# Patient Record
Sex: FEMALE | Race: WHITE | NOT HISPANIC OR LATINO | Employment: FULL TIME | URBAN - METROPOLITAN AREA
[De-identification: names, ages, dates, MRNs, and addresses within clinical notes are randomized per-mention and may not be internally consistent; named-entity substitution may affect disease eponyms.]

---

## 2017-07-24 ENCOUNTER — GENERIC CONVERSION - ENCOUNTER (OUTPATIENT)
Dept: OTHER | Facility: OTHER | Age: 47
End: 2017-07-24

## 2017-07-24 ENCOUNTER — ALLSCRIPTS OFFICE VISIT (OUTPATIENT)
Dept: OTHER | Facility: OTHER | Age: 47
End: 2017-07-24

## 2017-07-24 ENCOUNTER — APPOINTMENT (OUTPATIENT)
Dept: LAB | Facility: MEDICAL CENTER | Age: 47
End: 2017-07-24
Payer: COMMERCIAL

## 2017-07-24 DIAGNOSIS — R63.5 ABNORMAL WEIGHT GAIN: ICD-10-CM

## 2017-07-24 DIAGNOSIS — Z12.31 ENCOUNTER FOR SCREENING MAMMOGRAM FOR MALIGNANT NEOPLASM OF BREAST: ICD-10-CM

## 2017-07-24 DIAGNOSIS — E78.5 HYPERLIPIDEMIA: ICD-10-CM

## 2017-07-24 DIAGNOSIS — Z87.19 PERSONAL HISTORY OF OTHER DISEASES OF THE DIGESTIVE SYSTEM: ICD-10-CM

## 2017-07-24 DIAGNOSIS — E55.9 VITAMIN D DEFICIENCY: ICD-10-CM

## 2017-07-24 DIAGNOSIS — R73.9 HYPERGLYCEMIA: ICD-10-CM

## 2017-07-24 DIAGNOSIS — K21.9 GASTRO-ESOPHAGEAL REFLUX DISEASE WITHOUT ESOPHAGITIS: ICD-10-CM

## 2017-07-24 LAB
25(OH)D3 SERPL-MCNC: 24.9 NG/ML (ref 30–100)
ALBUMIN SERPL BCP-MCNC: 3.9 G/DL (ref 3.5–5)
ALP SERPL-CCNC: 75 U/L (ref 46–116)
ALT SERPL W P-5'-P-CCNC: 52 U/L (ref 12–78)
ANION GAP SERPL CALCULATED.3IONS-SCNC: 4 MMOL/L (ref 4–13)
AST SERPL W P-5'-P-CCNC: 27 U/L (ref 5–45)
BASOPHILS # BLD AUTO: 0.02 THOUSANDS/ΜL (ref 0–0.1)
BASOPHILS NFR BLD AUTO: 0 % (ref 0–1)
BILIRUB SERPL-MCNC: 0.36 MG/DL (ref 0.2–1)
BILIRUB UR QL STRIP: NORMAL
BUN SERPL-MCNC: 10 MG/DL (ref 5–25)
CALCIUM SERPL-MCNC: 8.9 MG/DL (ref 8.3–10.1)
CHLORIDE SERPL-SCNC: 108 MMOL/L (ref 100–108)
CO2 SERPL-SCNC: 28 MMOL/L (ref 21–32)
COLOR UR: YELLOW
CREAT SERPL-MCNC: 0.72 MG/DL (ref 0.6–1.3)
EOSINOPHIL # BLD AUTO: 0 THOUSAND/ΜL (ref 0–0.61)
EOSINOPHIL NFR BLD AUTO: 0 % (ref 0–6)
ERYTHROCYTE [DISTWIDTH] IN BLOOD BY AUTOMATED COUNT: 13 % (ref 11.6–15.1)
EST. AVERAGE GLUCOSE BLD GHB EST-MCNC: 114 MG/DL
GFR SERPL CREATININE-BSD FRML MDRD: 100 ML/MIN/1.73SQ M
GLUCOSE (HISTORICAL): NORMAL
GLUCOSE SERPL-MCNC: 85 MG/DL (ref 65–140)
HBA1C MFR BLD: 5.6 % (ref 4.2–6.3)
HCT VFR BLD AUTO: 42.2 % (ref 34.8–46.1)
HGB BLD-MCNC: 14.2 G/DL (ref 11.5–15.4)
HGB UR QL STRIP.AUTO: NORMAL
KETONES UR STRIP-MCNC: NORMAL MG/DL
LEUKOCYTE ESTERASE UR QL STRIP: NORMAL
LYMPHOCYTES # BLD AUTO: 1.49 THOUSANDS/ΜL (ref 0.6–4.47)
LYMPHOCYTES NFR BLD AUTO: 33 % (ref 14–44)
MCH RBC QN AUTO: 29.9 PG (ref 26.8–34.3)
MCHC RBC AUTO-ENTMCNC: 33.6 G/DL (ref 31.4–37.4)
MCV RBC AUTO: 89 FL (ref 82–98)
MONOCYTES # BLD AUTO: 0.41 THOUSAND/ΜL (ref 0.17–1.22)
MONOCYTES NFR BLD AUTO: 9 % (ref 4–12)
NEUTROPHILS # BLD AUTO: 2.58 THOUSANDS/ΜL (ref 1.85–7.62)
NEUTS SEG NFR BLD AUTO: 58 % (ref 43–75)
NITRITE UR QL STRIP: NORMAL
NRBC BLD AUTO-RTO: 0 /100 WBCS
PH UR STRIP.AUTO: 5 [PH]
PLATELET # BLD AUTO: 228 THOUSANDS/UL (ref 149–390)
PMV BLD AUTO: 10.8 FL (ref 8.9–12.7)
POTASSIUM SERPL-SCNC: 4.2 MMOL/L (ref 3.5–5.3)
PROT SERPL-MCNC: 7.1 G/DL (ref 6.4–8.2)
PROT UR STRIP-MCNC: NORMAL MG/DL
RBC # BLD AUTO: 4.75 MILLION/UL (ref 3.81–5.12)
SODIUM SERPL-SCNC: 140 MMOL/L (ref 136–145)
SP GR UR STRIP.AUTO: 1.01
TSH SERPL DL<=0.05 MIU/L-ACNC: 1.48 UIU/ML (ref 0.36–3.74)
UROBILINOGEN UR QL STRIP.AUTO: NORMAL
WBC # BLD AUTO: 4.52 THOUSAND/UL (ref 4.31–10.16)

## 2017-07-24 PROCEDURE — 80053 COMPREHEN METABOLIC PANEL: CPT

## 2017-07-24 PROCEDURE — 82306 VITAMIN D 25 HYDROXY: CPT

## 2017-07-24 PROCEDURE — 85025 COMPLETE CBC W/AUTO DIFF WBC: CPT

## 2017-07-24 PROCEDURE — 84443 ASSAY THYROID STIM HORMONE: CPT

## 2017-07-24 PROCEDURE — 83036 HEMOGLOBIN GLYCOSYLATED A1C: CPT

## 2017-07-24 PROCEDURE — 36415 COLL VENOUS BLD VENIPUNCTURE: CPT

## 2017-08-23 ENCOUNTER — HOSPITAL ENCOUNTER (OUTPATIENT)
Dept: RADIOLOGY | Facility: MEDICAL CENTER | Age: 47
Discharge: HOME | End: 2017-08-23
Payer: COMMERCIAL

## 2017-08-23 DIAGNOSIS — Z12.31 ENCOUNTER FOR SCREENING MAMMOGRAM FOR MALIGNANT NEOPLASM OF BREAST: ICD-10-CM

## 2017-08-23 PROCEDURE — G0202 SCR MAMMO BI INCL CAD: HCPCS

## 2017-09-27 ENCOUNTER — ALLSCRIPTS OFFICE VISIT (OUTPATIENT)
Dept: OTHER | Facility: OTHER | Age: 47
End: 2017-09-27

## 2017-10-16 ENCOUNTER — ANESTHESIA EVENT (OUTPATIENT)
Dept: PERIOP | Facility: AMBULARY SURGERY CENTER | Age: 47
End: 2017-10-16

## 2017-10-23 ENCOUNTER — ANESTHESIA (OUTPATIENT)
Dept: PERIOP | Facility: AMBULARY SURGERY CENTER | Age: 47
End: 2017-10-23

## 2017-10-27 ENCOUNTER — GENERIC CONVERSION - ENCOUNTER (OUTPATIENT)
Dept: OTHER | Facility: OTHER | Age: 47
End: 2017-10-27

## 2017-10-27 NOTE — CONSULTS
Assessment  1  GERD (gastroesophageal reflux disease) (530 81) (K21 9)    Plan  GERD (gastroesophageal reflux disease)    · Dexilant 60 MG Oral Capsule Delayed Release; TAKE 1 CAPSULE DAILY EVERY  MORNING BEFORE BREAKFAST   Rx By: So Patel; Dispense: 30 Days ; #:30 Capsule Delayed Release; Refill: 11; For: GERD (gastroesophageal reflux disease); MARIBEL = N; Verified Transmission to [facility]Y #[id number] Last Updated By: System, [place]; 9/27/2017 4:25:05 PM   · EGD; Status:Hold For - Scheduling; Requested XJU:40VST0500;    Perform:[facility]; RXW:24LSG7016;TWLIESV; For:GERD (gastroesophageal reflux disease); Ordered By:Suresh Richards; Discussion/Summary  Discussion Summary:     Gastroesophageal reflux disease, long-standing?rule out Solis's esophagus  Continue Dexilant  Patient was explained about the lifestyle and dietary modifications  Advised to avoid fatty foods, chocolates, caffeine, alcohol and any other triggering foods  Avoid eating for at least 3 hours before going to bed  Schedule for upper endoscopy  Patient was explained about the risks and benefits of the procedure  Risks including but not limited to bleeding, infection, perforation were explained in detail  Also explained about less than 100% sensitivity with the exam and other alternatives  Counseling Documentation With Imm: The patient was counseled regarding instructions for management,-- risk factor reductions,-- patient and family education,-- risks and benefits of treatment options,-- importance of compliance with treatment  Chief Complaint  Chief Complaint Free Text Note Form:      History of Present Illness  HPI: female with history of GERD came in as she moved to this area recently  She gives history of chronic acid reflux and had upper endoscopy in [place] in January 2016  EGD showed diffuse mucosal rings in the esophagus and stricture at the GE junction but the biopsies were negative for esophagitis   She has been doing well on Dexilant  Denies any difficulty swallowing  Good appetite, no recent weight loss  Regular bowel movements, denies any blood or mucus in the stool  No abdominal pain, nausea or vomiting  History Reviewed: The history was obtained today from the patient and I agree with the documented history  Review of Systems  Complete-Female GI Adult:   Constitutional: No fever, no chills, feels well, no tiredness, no recent weight gain or weight loss  Eyes: No complaints of eye pain, no red eyes, no eyesight problems, no discharge, no dry eyes, no itching of eyes  ENT: no complaints of earache, no loss of hearing, no nose bleeds, no nasal discharge, no sore throat, no hoarseness  Cardiovascular: No complaints of slow heart rate, no fast heart rate, no chest pain, no palpitations, no leg claudication, no lower extremity edema  Respiratory: No complaints of shortness of breath, no wheezing, no cough, no SOB on exertion, no orthopnea, no PND  Gastrointestinal: as noted in HPI  Genitourinary: No complaints of dysuria, no incontinence, no pelvic pain, no dysmenorrhea, no vaginal discharge or bleeding  Musculoskeletal: No complaints of arthralgias, no myalgias, no joint swelling or stiffness, no limb pain or swelling  Integumentary: No complaints of skin rash or lesions, no itching, no skin wounds, no breast pain or lump  Neurological: No complaints of headache, no confusion, no convulsions, no numbness, no dizziness or fainting, no tingling, no limb weakness, no difficulty walking  Psychiatric: Not suicidal, no sleep disturbance, no anxiety or depression, no change in personality, no emotional problems  Endocrine: No complaints of proptosis, no hot flashes, no muscle weakness, no deepening of the voice, no feelings of weakness  Hematologic/Lymphatic: No complaints of swollen glands, no swollen glands in the neck, does not bleed easily, does not bruise easily  Active Problems  1  Borderline hyperglycemia (790 29) (R73 9)   2  Encounter for screening mammogram for malignant neoplasm of breast (V76 12)   (Z12 31)   3  GERD (gastroesophageal reflux disease) (530 81) (K21 9)   4  History of esophageal stricture (V12 79) (Z87 19)   5  Hyperlipidemia (272 4) (E78 5)   6  Psoriasis (696 1) (L40 9)   7  Seasonal allergies (477 9) (J30 2)   8  Vitamin D deficiency (268 9) (E55 9)   9  Weight gain (783 1) (R63 5)    Past Medical History  1  Denied: History of depression   2  History of migraine (V12 49) (Z86 69)   3  Denied: History of substance abuse    Surgical History  1  Denied: History Of Prior Surgery  Surgical History Reviewed: The surgical history was reviewed and updated today  Family History  Mother    1  Family history of asthma (V17 5) (Z82 5)   2  Family history of chronic obstructive pulmonary disease (V17 6) (Z82 5)   3  Denied: Family history of depression   4  Denied: Family history of substance abuse   5  Family history of Fuchs' corneal dystrophy  Father    6  Denied: Family history of depression   7  Family history of hyperlipidemia (V18 19) (Z83 49)   8  Denied: Family history of substance abuse  Family History Reviewed: The family history was reviewed and updated today  Social History   · Never a smoker  Social History Reviewed: The social history was reviewed and updated today  The social history was reviewed and is unchanged  Current Meds   1  Calcipotriene-Betameth Diprop 0 005-0 064 % External Ointment; APPLY AND GENTLY   MASSAGE INTO AFFECTED AREA(S) ONCE DAILY; Therapy: 89QZK0683 to (Last Rx:83Hww4629)  Requested for: 30Wsp0726 Ordered   2  Calcium 600/Vitamin D3 600-800 MG-UNIT Oral Tablet; 1 Tab daily; Therapy: 47TRB8597 to Recorded   3  CholestOff Plus 450 MG Oral Capsule; 2 tabs daily; Therapy: 76ZKX4927 to (Last Rx:76Sfp3746) Ordered   4   Dexilant 60 MG Oral Capsule Delayed Release; TAKE 1 CAPSULE DAILY EVERY   MORNING BEFORE BREAKFAST Requested for: 56Nio9631; Last Rx:30Maq8276   Ordered   5  Vitamin D3 2000 UNIT Oral Capsule; take 1 capsule daily; Therapy: 43ERK9832 to (Last Rx:06Ygg5331) Ordered   6  ZyrTEC Allergy 10 MG Oral Capsule; Therapy: (Recorded:36Xpg3961) to Recorded  Medication List Reviewed: The medication list was reviewed and updated today  Allergies  1  Sulfa Drugs   2  ZOLMitriptan TABS    Vitals  Vital Signs    Recorded: 00CVS7663 04:06PM   Temperature 98 2 F   Heart Rate 81   Respiration 16   Systolic 880   Diastolic 84   Height 5 ft 4 in   Weight 155 lb 6 oz   BMI Calculated 26 67   BSA Calculated 1 76   O2 Saturation 98     Physical Exam    Constitutional   General appearance: No acute distress, well appearing and well nourished  Eyes   Conjunctiva and lids: No swelling, erythema or discharge  Pupils and irises: Equal, round and reactive to light  Ears, Nose, Mouth, and Throat   External inspection of ears and nose: Normal     Oropharynx: Normal with no erythema, edema, exudate or lesions  Pulmonary   Respiratory effort: No increased work of breathing or signs of respiratory distress  Auscultation of lungs: Clear to auscultation  Cardiovascular   Palpation of heart: Normal PMI, no thrills  Auscultation of heart: Normal rate and rhythm, normal S1 and S2, without murmurs  Examination of extremities for edema and/or varicosities: Normal     Carotid pulses: Normal     Abdomen   Abdomen: Non-tender, no masses  Liver and spleen: No hepatomegaly or splenomegaly  Lymphatic   Palpation of lymph nodes in neck: No lymphadenopathy  Musculoskeletal   Gait and station: Normal     Digits and nails: Normal without clubbing or cyanosis  Inspection/palpation of joints, bones, and muscles: Normal     Skin   Skin and subcutaneous tissue: Normal without rashes or lesions      Psychiatric   Orientation to person, place, and time: Normal     Mood and affect: Normal          Signatures   Electronically signed by : KETTY Swartz ; Sep 27 2017  5:14PM EST                       (Author)

## 2017-12-05 ENCOUNTER — GENERIC CONVERSION - ENCOUNTER (OUTPATIENT)
Dept: OTHER | Facility: OTHER | Age: 47
End: 2017-12-05

## 2018-01-12 VITALS
BODY MASS INDEX: 26.53 KG/M2 | WEIGHT: 155.38 LBS | OXYGEN SATURATION: 98 % | DIASTOLIC BLOOD PRESSURE: 84 MMHG | TEMPERATURE: 98.2 F | SYSTOLIC BLOOD PRESSURE: 122 MMHG | RESPIRATION RATE: 16 BRPM | HEIGHT: 64 IN | HEART RATE: 81 BPM

## 2018-01-15 NOTE — PROGRESS NOTES
Assessment    1  GERD (gastroesophageal reflux disease) (530 81) (K21 9)   2  Encounter for preventive health examination (V70 0) (Z00 00)   3  Hyperlipidemia (272 4) (E78 5)   4  History of esophageal stricture (V12 79) (Z87 19)   5  Weight gain (783 1) (R63 5)   6  Encounter for screening mammogram for malignant neoplasm of breast (V76 12)   (Z12 31)   7  Psoriasis (696 1) (L40 9)   8  History of migraine (V12 49) (Z86 69)   9  Seasonal allergies (477 9) (J30 2)   10  Vitamin D deficiency (268 9) (E55 9)   11  Borderline hyperglycemia (790 29) (R73 9)    Plan  Borderline hyperglycemia    · (1) HEMOGLOBIN A1C; Status:Active; Requested for:39Tbr7848;   Encounter for screening mammogram for malignant neoplasm of breast    · * MAMMO SCREENING BILATERAL W CAD; Status:Hold For - Scheduling; Requested  for:33Mvf9498;   Encounter for screening mammogram for malignant neoplasm of breast, Hyperlipidemia    · (1) TSH; Status:Active; Requested for:96Juw5675;   GERD (gastroesophageal reflux disease), History of esophageal stricture    · 1 - Mala eL MD, Neris Bolanos (Gastroenterology) Co-Management  *  Status: Active   Requested for: 16GCB4740  Care Summary provided  : Yes  GERD (gastroesophageal reflux disease), History of esophageal stricture,  Hyperlipidemia, Weight gain    · (1) CBC/PLT/DIFF; Status:Active; Requested for:79Btd6064;   GERD (gastroesophageal reflux disease), Hyperlipidemia, Weight gain    · (1) COMPREHENSIVE METABOLIC PANEL; Status:Active; Requested for:28Uyq2036; Health Maintenance    · Urine Dip Non-Automated- POC; Status:Complete - Retrospective Authorization;   Done:  83HZC5625 08:47AM  Hyperlipidemia    · CholestOff Plus 450 MG Oral Capsule; 2 tabs daily  Psoriasis    · Calcipotriene-Betameth Diprop 0 005-0 064 % External Ointment; APPLY AND  GENTLY MASSAGE INTO AFFECTED AREA(S) ONCE DAILY  Vitamin D deficiency    · (1) VITAMIN D 25-HYDROXY; Status:Active;  Requested for:21Pje9303; Discussion/Summary  health maintenance visit next cervical cancer screening is due Breast cancer screening: mammogram has been ordered  Colorectal cancer screening: colorectal cancer screening is not indicated  Check labs, will call with results  see GI  return for pap  increase exercise, 100 diet tips handout given  Possible side effects of new medications were reviewed with the patient/guardian today  The treatment plan was reviewed with the patient/guardian  The patient/guardian understands and agrees with the treatment plan      Chief Complaint  patient presented here for physical      History of Present Illness  HM, Adult Female: The patient is being seen for a health maintenance evaluation  General Health: The patient's health since the last visit is described as good  She has regular dental visits  Lifestyle:  She does not have a healthy diet  She has weight concerns  Weight control issues: overweight and recent 15 lbs weight gain  She does not exercise regularly  She does not use tobacco  She consumes alcohol  She reports occasional alcohol use  Reproductive health: the patient is perimenopausal   she uses contraception  For contraception, she has a partner with a vasectomy  she is sexually active  one year since LMP  Screening:   HPI: new to the area from [place]  had BW done in May 2017 for insurance  weight increased with the move, no fam h/o thyroid disease, needs new GI for mgmt of GERD, last EGD was Jan 2016  chol 240, trig 121, HDL 73, SHE898, glucose 97  no h/o GDM or thyroid disease in preg, some preg induced HTN at the end of the preg  due for GYN care  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[place], works in pharmaceutical  h/o migraines, but have subsided since stopping her menses  denies DV  adeq calcium      Review of Systems    Constitutional: recent weight gain  Eyes: No complaints of eye pain, no red eyes, no eyesight problems, no discharge, no dry eyes, no itching of eyes     ENT: no complaints of earache, no loss of hearing, no nose bleeds, no nasal discharge, no sore throat, no hoarseness  Cardiovascular: No complaints of slow heart rate, no fast heart rate, no chest pain, no palpitations, no leg claudication, no lower extremity edema  Respiratory: No complaints of shortness of breath, no wheezing, no cough, no SOB on exertion, no orthopnea, no PND  Gastrointestinal: as noted in HPI  Genitourinary: No complaints of dysuria, no incontinence, no pelvic pain, no dysmenorrhea, no vaginal discharge or bleeding  Neurological: No complaints of headache, no confusion, no convulsions, no numbness, no dizziness or fainting, no tingling, no limb weakness, no difficulty walking  Hematologic/Lymphatic: No complaints of swollen glands, no swollen glands in the neck, does not bleed easily, does not bruise easily  ROS reviewed  Past Medical History    · Denied: History of depression   · History of migraine (V12 49) (Z86 69)   · Denied: History of substance abuse    Surgical History    · Denied: History Of Prior Surgery    Family History  Mother    · Family history of asthma (V17 5) (Z82 5)   · Family history of chronic obstructive pulmonary disease (V17 6) (Z82 5)   · Denied: Family history of depression   · Denied: Family history of substance abuse   · Family history of Fuchs' corneal dystrophy  Father    · Denied: Family history of depression   · Family history of hyperlipidemia (V18 19) (Z83 49)   · Denied: Family history of substance abuse    Social History    · Never a smoker    Current Meds   1  Calcium 600/Vitamin D3 600-800 MG-UNIT Oral Tablet; 1 Tab daily; Therapy: 75BHJ4361 to Recorded   2  Dexilant 60 MG Oral Capsule Delayed Release; TAKE 1 CAPSULE DAILY EVERY   MORNING BEFORE BREAKFAST; Therapy: (Recorded:61Kuw0260) to Recorded   3  ZyrTEC Allergy 10 MG Oral Capsule; Therapy: (Recorded:26Pel0101) to Recorded    Allergies    1  Sulfa Drugs   2   ZOLMitriptan TABS    Vitals   Recorded: 05Xrn5104 08:34AM   Temperature 97 6 F, Tympanic   Heart Rate 88   Pulse Quality Normal   Respiration Quality Normal   Respiration 16   Systolic 412, LUE, Sitting   Diastolic 74, LUE, Sitting   Height 5 ft 4 in   Weight 153 lb    BMI Calculated 26 26   BSA Calculated 1 75   O2 Saturation 99   LMP 63Wgq1756     Physical Exam    Constitutional   General appearance: No acute distress, well appearing and well nourished  Eyes   Conjunctiva and lids: No swelling, erythema or discharge  Pupils and irises: Equal, round, reactive to light  Ears, Nose, Mouth, and Throat   External inspection of ears and nose: Normal     Otoscopic examination: Tympanic membranes translucent with normal light reflex  Canals patent without erythema  Hearing: Normal     Nasal mucosa, septum, and turbinates: Normal without edema or erythema  Lips, teeth, and gums: Normal, good dentition  Oropharynx: Normal with no erythema, edema, exudate or lesions  Neck   Neck: Supple, symmetric, trachea midline, no masses  Thyroid: Normal, no thyromegaly  Pulmonary   Respiratory effort: No increased work of breathing or signs of respiratory distress  Auscultation of lungs: Clear to auscultation  Cardiovascular   Auscultation of heart: Normal rate and rhythm, normal S1 and S2, no murmurs  Examination of extremities for edema and/or varicosities: Normal     Abdomen   Abdomen: Non-tender, no masses  Liver and spleen: No hepatomegaly or splenomegaly  Lymphatic   Palpation of lymph nodes in neck: No lymphadenopathy  Musculoskeletal   Gait and station: Normal     Joints, bones, and muscles: Normal     Range of motion: Normal     Stability: Normal     Muscle strength/tone: Normal     Neurologic   Cortical function: Normal mental status  Psychiatric   Judgment and insight: Normal     Orientation to person, place, and time: Normal     Recent and remote memory: Intact      Mood and affect: Normal  Results/Data  Urine Dip Non-Automated- POC 59VZK1137 08:47AM Isael Paul     Test Name Result Flag Reference   Color Yellow     Leukocytes neg     Nitrite neg     Blood neg     Bilirubin neg     Urobilinogen neg     Protein neg     Ph 5     Specific Gravity 1 015     Ketone neg     Glucose neg         Procedure    Procedure: Hearing Acuity Test    Indication: Routine screeing  Audiometry: Normal bilaterally  Hearing in the right ear: 20 decibals at 500 hertz, 20 decibals at 1000 hertz, 20 decibals at 2000 hertz and 20 decibals at 4000 hertz  Hearing in the left ear: 20 decibals at 500 hertz, 20 decibals at 1000 hertz, 20 decibals at 2000 hertz and 20 decibals at 4000 hertz  Procedure: Visual Acuity Test    Indication: routine screening  Results: 20/20 in both eyes with corrective device, 20/20 in the right eye with corrective device, 20/20 in the left eye with corrective device normal in both eyes     Color vision was and the results were normal       Signatures   Electronically signed by : KETTY Beal ; Jul 24 2017  9:35AM EST                       (Author)

## 2018-01-16 NOTE — RESULT NOTES
Verified Results  (1) TSH 24Jul2017 09:58AM Edna [place] Order Number: [id number]     Test Name Result Flag Reference   TSH 1 480 uIU/mL  0 358-3 740   Patients undergoing fluorescein dye angiography may retain small amounts of fluorescein in the body for 48-72 hours post procedure  Samples containing fluorescein can produce falsely depressed TSH values  If the patient had this procedure,a specimen should be resubmitted post fluorescein clearance  The recommended reference ranges for TSH during pregnancy are as follows:  First trimester 0 1 to 2 5 uIU/mL  Second trimester  0 2 to 3 0 uIU/mL  Third trimester 0 3 to 3 0 uIU/m     (1) CBC/PLT/DIFF 24Jul2017 09:58AM Edna [place] Order Number: [id number]     Test Name Result Flag Reference   WBC COUNT 4 52 Thousand/uL  4 31-10 16   RBC COUNT 4 75 Million/uL  3 81-5 12   HEMOGLOBIN 14 2 g/dL  11 5-15 4   HEMATOCRIT 42 2 %  34 8-46  1   MCV 89 fL  82-98   MCH 29 9 pg  26 8-34 3   MCHC 33 6 g/dL  31 4-37 4   RDW 13 0 %  11 6-15 1   MPV 10 8 fL  8 9-12 7   PLATELET COUNT 691 Thousands/uL  149-390   nRBC AUTOMATED 0 /100 WBCs     NEUTROPHILS RELATIVE PERCENT 58 %  43-75   LYMPHOCYTES RELATIVE PERCENT 33 %  14-44   MONOCYTES RELATIVE PERCENT 9 %  4-12   EOSINOPHILS RELATIVE PERCENT 0 %  0-6   BASOPHILS RELATIVE PERCENT 0 %  0-1   NEUTROPHILS ABSOLUTE COUNT 2 58 Thousands/? ??L  1 85-7 62   LYMPHOCYTES ABSOLUTE COUNT 1 49 Thousands/? ??L  0 60-4 47   MONOCYTES ABSOLUTE COUNT 0 41 Thousand/? ??L  0 17-1 22   EOSINOPHILS ABSOLUTE COUNT 0 00 Thousand/? ??L  0 00-0 61   BASOPHILS ABSOLUTE COUNT 0 02 Thousands/? ??L  0 00-0 10     (1) COMPREHENSIVE METABOLIC PANEL 49QXI9015 72:39DI [facility] [place] Order Number: [id number]     Test Name Result Flag Reference   GLUCOSE,RANDM 85 mg/dL     If the patient is fasting, the ADA then defines impaired fasting glucose as > 100 mg/dL and diabetes as > or equal to 123 mg/dL  SODIUM 140 mmol/L  136-145   POTASSIUM 4 2 mmol/L  3 5-5 3   CHLORIDE 108 mmol/L  100-108   CARBON DIOXIDE 28 mmol/L  21-32   ANION GAP (CALC) 4 mmol/L  4-13   BLOOD UREA NITROGEN 10 mg/dL  5-25   CREATININE 0 72 mg/dL  0 60-1 30   Standardized to IDMS reference method   CALCIUM 8 9 mg/dL  8 3-10 1   BILI, TOTAL 0 36 mg/dL  0 20-1 00   ALK PHOSPHATAS 75 U/L     ALT (SGPT) 52 U/L  12-78   AST(SGOT) 27 U/L  5-45   ALBUMIN 3 9 g/dL  3 5-5 0   TOTAL PROTEIN 7 1 g/dL  6 4-8 2   eGFR 100 ml/min/1 73sq m     National Kidney Disease Education Program recommendations are as follows:  GFR calculation is accurate only with a steady state creatinine  Chronic Kidney disease less than 60 ml/min/1 73 sq  meters  Kidney failure less than 15 ml/min/1 73 sq  meters  (1) VITAMIN D 25-HYDROXY 07Wkb0095 09:58AM [facility] Order Number: [id number]     Test Name Result Flag Reference   VIT D 25-HYDROX 24 9 ng/mL L 30 0-100 0   This assay is a certified procedure of the CDC Vitamin D Standardization Certification Program (VDSCP)     Deficiency <20ng/ml   Insufficiency 20-30ng/ml   Sufficient  ng/ml     *Patients undergoing fluorescein dye angiography may retain small amounts of fluorescein in the body for 48-72 hours post procedure  Samples containing fluorescein can produce falsely elevated Vitamin D values  If the patient had this procedure, a specimen should be resubmitted post fluorescein clearance  (1) HEMOGLOBIN A1C 10Ysz0387 09:58AM [facility] Order Number: [id number]     Test Name Result Flag Reference   HEMOGLOBIN A1C 5 6 %  4 2-6 3   EST  AVG   GLUCOSE 114 mg/dl         Plan  Vitamin D deficiency    · Vitamin D3 2000 UNIT Oral Capsule; take 1 capsule daily

## 2018-01-22 VITALS
BODY MASS INDEX: 26.29 KG/M2 | HEIGHT: 64 IN | WEIGHT: 154 LBS | OXYGEN SATURATION: 97 % | TEMPERATURE: 98.8 F | DIASTOLIC BLOOD PRESSURE: 82 MMHG | HEART RATE: 93 BPM | SYSTOLIC BLOOD PRESSURE: 120 MMHG

## 2018-01-22 VITALS
WEIGHT: 153 LBS | HEART RATE: 88 BPM | BODY MASS INDEX: 26.12 KG/M2 | HEIGHT: 64 IN | OXYGEN SATURATION: 99 % | SYSTOLIC BLOOD PRESSURE: 122 MMHG | DIASTOLIC BLOOD PRESSURE: 74 MMHG | RESPIRATION RATE: 16 BRPM | TEMPERATURE: 97.6 F

## 2018-01-23 NOTE — MISCELLANEOUS
Message  GI Reminder Recall [facility]:   Date: 12/05/2017   Dear Pradip Tracy:     Review of our records shows you are due for the following: EGD  Or records indicate that you are due at this time to have a follow-up examination for a EGD  As you know, these tests are done to prevent cancer, a very common disease in the [place] and responsible for thousands of patient deaths each year  We at [facility] are concerned for your health, and would very much appreciate you getting in touch with us at your earliest convenience  Again, this examination is vital to your proper health maintenance and for the prevention of cancer  Please call the following office to schedule your appointment:   [place] [address], [place], [address] [place] [address] [phone]  We look forward to hearing from you!      Sincerely,     [facility]      Signatures   Electronically signed by : Lizandro Randolph, ; Dec  5 2017 11:21AM EST                       (Author)

## 2018-01-24 ENCOUNTER — TELEPHONE (OUTPATIENT)
Dept: GASTROENTEROLOGY | Facility: CLINIC | Age: 48
End: 2018-01-24

## 2018-01-25 ENCOUNTER — TELEPHONE (OUTPATIENT)
Dept: GASTROENTEROLOGY | Facility: CLINIC | Age: 48
End: 2018-01-25

## 2018-04-27 ENCOUNTER — TELEPHONE (OUTPATIENT)
Dept: GASTROENTEROLOGY | Facility: CLINIC | Age: 48
End: 2018-04-27

## 2018-04-27 DIAGNOSIS — K21.9 GASTROESOPHAGEAL REFLUX DISEASE WITHOUT ESOPHAGITIS: Primary | ICD-10-CM

## 2018-04-27 RX ORDER — DEXLANSOPRAZOLE 60 MG/1
60 CAPSULE, DELAYED RELEASE ORAL
Refills: 0 | Status: CANCELLED | OUTPATIENT
Start: 2018-04-27

## 2018-04-27 RX ORDER — DEXLANSOPRAZOLE 60 MG/1
60 CAPSULE, DELAYED RELEASE ORAL
Qty: 30 CAPSULE | Refills: 4 | Status: SHIPPED | OUTPATIENT
Start: 2018-04-27